# Patient Record
Sex: MALE | Race: WHITE | URBAN - METROPOLITAN AREA
[De-identification: names, ages, dates, MRNs, and addresses within clinical notes are randomized per-mention and may not be internally consistent; named-entity substitution may affect disease eponyms.]

---

## 2020-08-19 ENCOUNTER — OFFICE VISIT (OUTPATIENT)
Dept: URBAN - METROPOLITAN AREA CLINIC 48 | Facility: CLINIC | Age: 65
End: 2020-08-19
Payer: MEDICARE

## 2020-08-19 PROCEDURE — 92004 COMPRE OPH EXAM NEW PT 1/>: CPT | Performed by: OPTOMETRIST

## 2020-08-19 RX ORDER — ALLOPURINOL 300 MG/1
300 MG TABLET ORAL
Qty: 0 | Refills: 0 | Status: ACTIVE
Start: 2020-08-19

## 2020-08-19 ASSESSMENT — INTRAOCULAR PRESSURE
OD: 14
OS: 16

## 2020-08-19 NOTE — IMPRESSION/PLAN
Impression: Open angle with borderline findings, low risk, bilateral: H40.013.
+ K spindles, possible pigmentary dispersion. Patient has been told in the past that he has pigmentary dispersion and does not have pigmentary dispersion. Plan: Discuss with patient, nature of disease and associated risk. RTC 3-4 weeks IOP, VF 24-2, OCT-ON, pachs , check for TID at next visit.

## 2020-08-19 NOTE — IMPRESSION/PLAN
Impression: Type 2 diabetes mellitus w/o complication: V29.6. Plan: Discussed with patient the importance of glucose control and ocular risk. 

Follow up annually with dilated fundus exam.

## 2020-09-11 ENCOUNTER — OFFICE VISIT (OUTPATIENT)
Dept: URBAN - METROPOLITAN AREA CLINIC 48 | Facility: CLINIC | Age: 65
End: 2020-09-11
Payer: MEDICARE

## 2020-09-11 PROCEDURE — 92014 COMPRE OPH EXAM EST PT 1/>: CPT | Performed by: OPTOMETRIST

## 2020-09-11 PROCEDURE — 92083 EXTENDED VISUAL FIELD XM: CPT | Performed by: OPTOMETRIST

## 2020-09-11 PROCEDURE — 92133 CPTRZD OPH DX IMG PST SGM ON: CPT | Performed by: OPTOMETRIST

## 2020-09-11 PROCEDURE — 76514 ECHO EXAM OF EYE THICKNESS: CPT | Performed by: OPTOMETRIST

## 2020-09-11 ASSESSMENT — INTRAOCULAR PRESSURE
OS: 16
OD: 15

## 2020-09-11 NOTE — IMPRESSION/PLAN
Impression: Open angle with borderline findings, low risk, bilateral: H40.013. per pachs: thin cornea OD, Average cornea OS 
OCT-ON Ave RNFL OD: 86, OS:75 RNFL loss OS, not necessarily glaucomatous loss VF 24-2 OD: no defect, OS: central depression, might correlate to RNFL loss OS Plan: Discuss with patient, nature of disease and associated risk. 

RTC 2-3 weeks DFE, OCT Mac GCL with Dr. Champagne Gent

## 2020-10-02 ENCOUNTER — OFFICE VISIT (OUTPATIENT)
Dept: URBAN - METROPOLITAN AREA CLINIC 48 | Facility: CLINIC | Age: 65
End: 2020-10-02
Payer: MEDICARE

## 2020-10-02 DIAGNOSIS — H40.013 OPEN ANGLE WITH BORDERLINE FINDINGS, LOW RISK, BILATERAL: ICD-10-CM

## 2020-10-02 PROCEDURE — 92004 COMPRE OPH EXAM NEW PT 1/>: CPT | Performed by: OPHTHALMOLOGY

## 2020-10-02 ASSESSMENT — INTRAOCULAR PRESSURE
OS: 16
OD: 15

## 2020-10-02 NOTE — IMPRESSION/PLAN
Impression: Open angle with borderline findings, low risk, bilateral: H40.013. GCA symptoms negative Plan: Patient has [place]  OU, 
some pigment dispira suspected, but no mid periphery TID. Will monitor  nerve retina and fields with out drops. If patient has a episode when vision dims, patient to come in to het pressure checks, tech may check IOP. RTC 3 months IOP check with repeat OCT nerve, color vision check and pupil check  ( short exam)

## 2020-10-02 NOTE — IMPRESSION/PLAN
Impression: Optic atrophy: H47.20. Plan: May be related to prior trama ( some thinning of the rim) or prior RD. ( Thin GCL at macula). Unclear if this is  stable or progressive.

## 2020-10-02 NOTE — IMPRESSION/PLAN
Impression: Type 2 diabetes mellitus w/o complication: Z85.2.  Plan: No diabetic retinopathy on today's exam

Recommend yearly diabetic exam with Dr. Mehdi Greenwood or Dr. Stephanie Tijerina

## 2021-01-06 ENCOUNTER — OFFICE VISIT (OUTPATIENT)
Dept: URBAN - METROPOLITAN AREA CLINIC 48 | Facility: CLINIC | Age: 66
End: 2021-01-06
Payer: MEDICARE

## 2021-01-06 PROCEDURE — 92012 INTRM OPH EXAM EST PATIENT: CPT | Performed by: OPHTHALMOLOGY

## 2021-01-06 PROCEDURE — 92133 CPTRZD OPH DX IMG PST SGM ON: CPT | Performed by: OPHTHALMOLOGY

## 2021-01-06 ASSESSMENT — INTRAOCULAR PRESSURE
OS: 17
OD: 17

## 2021-01-06 NOTE — IMPRESSION/PLAN
Impression: Open angle with borderline findings, low risk, bilateral: H40.013. Plan: RNFL thinning due to #1 , has pigment dispersia. When CAT surgery OD may have IOP elevated after surgery. Patient has history of LASIK.

## 2021-01-06 NOTE — IMPRESSION/PLAN
Impression: Unspecified optic atrophy: H47.20. Plan: likely to prior RD. no likely progressive. RTC 1 year follow up RTC 6 months DM

## 2021-07-14 ENCOUNTER — OFFICE VISIT (OUTPATIENT)
Dept: URBAN - METROPOLITAN AREA CLINIC 48 | Facility: CLINIC | Age: 66
End: 2021-07-14
Payer: MEDICARE

## 2021-07-14 DIAGNOSIS — E11.9 TYPE 2 DIABETES MELLITUS W/O COMPLICATION: Primary | ICD-10-CM

## 2021-07-14 PROCEDURE — 92133 CPTRZD OPH DX IMG PST SGM ON: CPT | Performed by: OPHTHALMOLOGY

## 2021-07-14 PROCEDURE — 99213 OFFICE O/P EST LOW 20 MIN: CPT | Performed by: OPHTHALMOLOGY

## 2021-07-14 RX ORDER — LATANOPROST 50 UG/ML
0.005 % SOLUTION OPHTHALMIC
Qty: 2.5 | Refills: 5 | Status: ACTIVE
Start: 2021-07-14

## 2021-07-14 RX ORDER — DORZOLAMIDE HCL 20 MG/ML
2 % SOLUTION/ DROPS OPHTHALMIC
Qty: 10 | Refills: 6 | Status: ACTIVE
Start: 2021-07-14

## 2021-07-14 ASSESSMENT — INTRAOCULAR PRESSURE
OS: 15
OD: 14

## 2021-07-14 NOTE — IMPRESSION/PLAN
Impression: Open angle with borderline findings, low risk, bilateral: H40.013. Plan: There is concern for progression in OS, due to progression we will start IOP reducing therapy at this time.  
Start: Latanoprost QHS OU, Dorzolamide BID OS

RTC 3wk IOP check w/ VF 24-2, OCT ON

## 2021-07-14 NOTE — IMPRESSION/PLAN
Impression: Type 2 diabetes mellitus w/o complication: S47.0.  Plan: No diabetic retinopathy on today's exam

Recommend yearly diabetic exam with Dr. Megan Gil or Dr. Megan Dow

## 2021-08-10 ENCOUNTER — OFFICE VISIT (OUTPATIENT)
Dept: URBAN - METROPOLITAN AREA CLINIC 48 | Facility: CLINIC | Age: 66
End: 2021-08-10
Payer: MEDICARE

## 2021-08-10 DIAGNOSIS — H47.20 OPTIC ATROPHY: ICD-10-CM

## 2021-08-10 PROCEDURE — 92083 EXTENDED VISUAL FIELD XM: CPT | Performed by: OPHTHALMOLOGY

## 2021-08-10 PROCEDURE — 99214 OFFICE O/P EST MOD 30 MIN: CPT | Performed by: OPHTHALMOLOGY

## 2021-08-10 PROCEDURE — 92133 CPTRZD OPH DX IMG PST SGM ON: CPT | Performed by: OPHTHALMOLOGY

## 2021-08-10 ASSESSMENT — INTRAOCULAR PRESSURE
OS: 14
OD: 12

## 2021-08-10 NOTE — IMPRESSION/PLAN
Impression: Open angle with borderline findings, low risk, bilateral: H40.013. 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 History of RD in OS. Discussed options with patient. Advised patient to continue current treatment. Testing performed today, discussed findings with patient - stable. 
Continue: Latanoprost QHS OU, AFT PRN OU, Dorzolamide BID OS

RTC 4mo IOP check w/ Gonio

## 2021-12-07 ENCOUNTER — OFFICE VISIT (OUTPATIENT)
Dept: URBAN - METROPOLITAN AREA CLINIC 48 | Facility: CLINIC | Age: 66
End: 2021-12-07
Payer: MEDICARE

## 2021-12-07 PROCEDURE — 99213 OFFICE O/P EST LOW 20 MIN: CPT | Performed by: OPHTHALMOLOGY

## 2021-12-07 PROCEDURE — 92020 GONIOSCOPY: CPT | Performed by: OPHTHALMOLOGY

## 2021-12-07 ASSESSMENT — INTRAOCULAR PRESSURE
OS: 14
OD: 15

## 2021-12-07 NOTE — IMPRESSION/PLAN
Impression: Open angle with borderline findings, low risk, bilateral: H40.013. 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 History of RD in OS. Discussed options with patient. Advised patient to continue current treatment. Testing performed today, discussed findings with patient - stable. Some of the OCT findings in OS may be related to old RD Options to be discussed with patient Gonio 12/07/21 OD: Anterior Bowing; Gonio open in all  4 quadrants w/ pigment noted at TM
OS: Anterior Bowing; Gonio open w/ some pigment at TM Plan: Low risk. IOP within acceptable range OU. Discussed trial off Dorzolamide due to stable IOP. Continue Latanoprost.
Continue: Latanoprost QHS OU, AFT PRN OU

d/c Dorzolamide RTC 3mo IOP check w/ OCT ON w/ progression analysis.

## 2022-03-09 ENCOUNTER — OFFICE VISIT (OUTPATIENT)
Dept: URBAN - METROPOLITAN AREA CLINIC 48 | Facility: CLINIC | Age: 67
End: 2022-03-09
Payer: MEDICARE

## 2022-03-09 PROCEDURE — 92012 INTRM OPH EXAM EST PATIENT: CPT | Performed by: OPHTHALMOLOGY

## 2022-03-09 PROCEDURE — 92133 CPTRZD OPH DX IMG PST SGM ON: CPT | Performed by: OPHTHALMOLOGY

## 2022-03-09 ASSESSMENT — INTRAOCULAR PRESSURE
OS: 15
OD: 13

## 2022-03-09 NOTE — IMPRESSION/PLAN
Impression: Open angle with borderline findings, low risk, bilateral: H40.013. Possible Optic atrophy Has Pigment Dispersion Syndrome in both eyes. History of RD in OS. Discussed options with patient. Advised patient to continue current treatment. Testing performed today, discussed findings with patient - stable. Some of the OCT findings in OS may be related to old RD Options to be discussed with patient Gonio 12/07/21 OD: Anterior Bowing; Gonio open in all  4 quadrants w/ pigment noted at TM
OS: Anterior Bowing; Gonio open w/ some pigment at TM Plan:  no evidence of progression OU on OCT nerve , continue on current regimen, IOP stable OU. Discontinue: 
-Latanoprost qhs OU
will restart as needed RTC 6 week IOP check RTC 3 months IOP check with OCT nerve

## 2022-04-21 ENCOUNTER — OFFICE VISIT (OUTPATIENT)
Dept: URBAN - METROPOLITAN AREA CLINIC 48 | Facility: CLINIC | Age: 67
End: 2022-04-21
Payer: MEDICARE

## 2022-04-21 DIAGNOSIS — H40.013 OPEN ANGLE WITH BORDERLINE FINDINGS, LOW RISK, BILATERAL: Primary | ICD-10-CM

## 2022-04-21 DIAGNOSIS — H47.20 OPTIC ATROPHY: ICD-10-CM

## 2022-04-21 PROCEDURE — 92012 INTRM OPH EXAM EST PATIENT: CPT | Performed by: OPHTHALMOLOGY

## 2022-04-21 ASSESSMENT — INTRAOCULAR PRESSURE
OD: 16
OS: 16

## 2022-05-23 NOTE — IMPRESSION/PLAN
Impression: Optic atrophy: H47.20.  Plan: likely not related to [facility] , likely   related to TRD OS


RTC  6 months IOP check with OCT progression analysis Patient here today for EKG test.

## 2023-11-13 ENCOUNTER — OFFICE VISIT (OUTPATIENT)
Dept: URBAN - METROPOLITAN AREA CLINIC 48 | Facility: CLINIC | Age: 68
End: 2023-11-13
Payer: MEDICARE

## 2023-11-13 DIAGNOSIS — E11.9 TYPE 2 DIABETES MELLITUS W/O COMPLICATION: Primary | ICD-10-CM

## 2023-11-13 DIAGNOSIS — H47.20 OPTIC ATROPHY: ICD-10-CM

## 2023-11-13 PROCEDURE — 99214 OFFICE O/P EST MOD 30 MIN: CPT | Performed by: OPHTHALMOLOGY

## 2023-11-13 PROCEDURE — 92133 CPTRZD OPH DX IMG PST SGM ON: CPT | Performed by: OPHTHALMOLOGY

## 2023-11-13 ASSESSMENT — INTRAOCULAR PRESSURE
OD: 16
OS: 16

## 2025-05-19 ENCOUNTER — OFFICE VISIT (OUTPATIENT)
Dept: URBAN - METROPOLITAN AREA CLINIC 49 | Facility: LOCATION | Age: 70
End: 2025-05-19
Payer: MEDICARE

## 2025-05-19 DIAGNOSIS — E11.9 TYPE 2 DIABETES MELLITUS WITHOUT COMPLICATIONS: ICD-10-CM

## 2025-05-19 DIAGNOSIS — H43.811 VITREOUS DEGENERATION, RIGHT EYE: ICD-10-CM

## 2025-05-19 DIAGNOSIS — H33.8 OTHER RETINAL DETACHMENTS: Primary | ICD-10-CM

## 2025-05-19 PROCEDURE — 92134 CPTRZ OPH DX IMG PST SGM RTA: CPT | Performed by: OPHTHALMOLOGY

## 2025-05-19 PROCEDURE — 99204 OFFICE O/P NEW MOD 45 MIN: CPT | Performed by: OPHTHALMOLOGY

## 2025-05-19 ASSESSMENT — INTRAOCULAR PRESSURE
OD: 14
OS: 17